# Patient Record
Sex: FEMALE | URBAN - METROPOLITAN AREA
[De-identification: names, ages, dates, MRNs, and addresses within clinical notes are randomized per-mention and may not be internally consistent; named-entity substitution may affect disease eponyms.]

---

## 2022-09-12 ENCOUNTER — OFFICE VISIT (OUTPATIENT)
Dept: URBAN - METROPOLITAN AREA CLINIC 111 | Facility: CLINIC | Age: 52
End: 2022-09-12

## 2023-08-09 ENCOUNTER — LAB OUTSIDE AN ENCOUNTER (OUTPATIENT)
Dept: URBAN - METROPOLITAN AREA CLINIC 111 | Facility: CLINIC | Age: 53
End: 2023-08-09

## 2023-08-09 ENCOUNTER — OFFICE VISIT (OUTPATIENT)
Dept: URBAN - METROPOLITAN AREA CLINIC 111 | Facility: CLINIC | Age: 53
End: 2023-08-09
Payer: COMMERCIAL

## 2023-08-09 ENCOUNTER — DASHBOARD ENCOUNTERS (OUTPATIENT)
Age: 53
End: 2023-08-09

## 2023-08-09 VITALS
BODY MASS INDEX: 32.1 KG/M2 | DIASTOLIC BLOOD PRESSURE: 95 MMHG | WEIGHT: 188 LBS | TEMPERATURE: 97.7 F | HEART RATE: 78 BPM | HEIGHT: 64 IN | SYSTOLIC BLOOD PRESSURE: 158 MMHG

## 2023-08-09 DIAGNOSIS — R12 HEARTBURN: ICD-10-CM

## 2023-08-09 DIAGNOSIS — R09.89 GLOBUS SENSATION: ICD-10-CM

## 2023-08-09 DIAGNOSIS — R13.10 ODYNOPHAGIA AND DYSPHAGIA: ICD-10-CM

## 2023-08-09 DIAGNOSIS — R10.13 DYSPEPSIA: ICD-10-CM

## 2023-08-09 PROBLEM — 426232007: Status: ACTIVE | Noted: 2023-08-09

## 2023-08-09 PROCEDURE — 99244 OFF/OP CNSLTJ NEW/EST MOD 40: CPT | Performed by: INTERNAL MEDICINE

## 2023-08-09 PROCEDURE — 99204 OFFICE O/P NEW MOD 45 MIN: CPT | Performed by: INTERNAL MEDICINE

## 2023-08-09 NOTE — HPI-TODAY'S VISIT:
- 54 yo female from [place], referred by Dr. Hayley Valiente for further evaluation of GI complaints as detailed below, which have been ongoing for approximately 1 year.  A copy of this note will be sent to the referring physician. - Daily upper GI symptoms of dysphagia, odynophagia, and heartburn.  Dysphagia and odynophagia are to solid foods only, worse with peanuts, tomatoes with skin, breads, rice.  Foods may stick in her neck and she may need to take a lot of liquids for the food to go down. - Associated symptoms include hoarseness and rare dyspepsia  - She takes omeprazole 1-2 times per month for the above symptoms, with only minimal relief of symptoms - Patient has never had a colonoscopy in the past - Denies hematochezia, change in bowel habits, or weight loss - Denies family history of GI malignancies in any first degree relatives

## 2023-08-18 ENCOUNTER — OFFICE VISIT (OUTPATIENT)
Dept: URBAN - METROPOLITAN AREA SURGERY CENTER 15 | Facility: SURGERY CENTER | Age: 53
End: 2023-08-18

## 2023-08-18 ENCOUNTER — TELEPHONE ENCOUNTER (OUTPATIENT)
Dept: URBAN - METROPOLITAN AREA CLINIC 23 | Facility: CLINIC | Age: 53
End: 2023-08-18